# Patient Record
(demographics unavailable — no encounter records)

---

## 2024-10-07 NOTE — HISTORY OF PRESENT ILLNESS
[FreeTextEntry1] : MARIUM NG is a 50 year who returns to follow up for mTBI and post traumatic migraine  last visit was 7/8/2024  since last visit in first 2 months after botox continues to be highly effective and enable much more normal life functioning. this past time it again wore off last couple weeks. had to use excessive rizatriptan in the last 2 weeks.   continues topiramate 50/50, memantine 5/5, gabapentin 300  has still no days without headache. severe migraine headaches, unilat, throbbing, photophobia, dizziness, nausea, in 10 weeks after botox 1-2 weekly, but in last 2 will have them daily. prior to botox headaches were daily with more severe headaches. I have been administering botox for about a year which has helped her functioning dramatically due to a >50% reduction in headache intensity.  preventitive medications include topiramate 50mg BID, memantine 5mg BID, gabapentin 600mg QHS (previously was 900mg QHS). early in her course we tried nortriptyline which caused significant sedation and lethargy at 10mg. higher doses of topiramate caused signfiicant word finding issues. we also tried propranolol up to 120mg which didn't work. we also tried sertraline 25mg which was not well tolerated.  still off balance, still eye movement dysfunctoin

## 2024-10-07 NOTE — PROCEDURE
[FreeTextEntry1] : Chronic migraine botox procedure:  Injection Procedure Record Dilution: 4mL of preservative-free saline per 200U of Botox, Needle: 30 gauge half inch Administration: Inject 0.1 mL (5 units) intramuscularly at each injection site. Side: bilateral EMG guidance: no Tolerated well: Yes Complications: No  Muscle: dosage : 5 U x 2 Procerus: 5 U x 1 Frontalis: 10 U x 2 Temporalis: 20 U x 2 Occipitalis: 15 U x 2 Cervical paraspinals: 10 U x 2 Trapezius: 15 U x 2 Masseter: 0 U  Total units used for injection: 155 U Waste: 45 U  Botox vial information: Lot number: [id number]  Expiration date: 12/2026  Note: Botulinum toxin was charged   I discussed my findings, assessment and plan of action with patient. Potential risks and benefits of injecting Botox discussed in detail and all questions and concerns answered. Potential risks and benefits of other newly prescribed or medications that were continued/renewed reviewed with patient as well. Immediate questions answered. Patient verbalized understanding and agreed with treatment plan. Patient is to follow up with me as above, or sooner as necessary. Patient is to call or e-mail me back with questions or concerns.

## 2024-10-07 NOTE — PHYSICAL EXAM
[FreeTextEntry1] : General: this is a pleasant patient in no acute distress    HEENT conjuntiva are normal    Mental status:  Alert and oriented to person, place and time  Memory is abnormal, came to wrong office today because she forgot which to go to  Language is abnormal, occasional speech hesitancy and word finding difficulties    Head:  Palpation of cranium and jaw: tender temples  Occipital nerve tenderness: +++ L and ++R    Cervical Spine:  Palpation: very tender paraspinals and traps  Lateral Rotation: very limited to L but mild limited to R  Lateral Flexion: very limited b/l  Flexion + rotation: flexion very limited    Eye movements:  extra-occular movements in tact without nystagmus  Saccades: ok  Smooth Pursuit: ok  Visually enhanced VOR: dizzy  Near point of convergence: 30cm    Gait: antalgic, needs cane    Stances:  Romberg: unstable  Shapened romberg: unstable  Single leg, eyes closed: unstable

## 2024-10-31 NOTE — ASSESSMENT
[FreeTextEntry1] : Check Hgb regularly - seeing hematologist. Seeing gynecologist for heavy periods. Needs u/s.   shoulder/cervical - doing acupuncture and PT at [place]. Seeing endocrinologist who put her on ozempic for weight loss. f/u Dr Ortiz (Neuro). Reschedule EMG RUE. work more on periscap muscles shreya serratus anterior get back to water aerobics  lumbar - get back to HEP walk with help reminded to get MRI cervical and lumbar soon shreya since left leg is weaker and in pain   f/u 1 month

## 2024-10-31 NOTE — PHYSICAL EXAM
[FreeTextEntry1] : MARIUM is a 50 year old female  Constitutional: healthy appearing, NAD, and obese  LUMBAR  ROM: flexion to 30 deg, ext to 10 deg with pain  Gait: slightly antalgic and slow with cane  Inspection: no erythema, warmth  Spine: no TTP in spinous process, SI joint, sacrum  Bony palpation: mild TTP in GT on left  Soft tissue palpation hip: no TTP in gluteus aurelio, medius Soft tissue palpation of spine: TTP in right lumbar paraspinals  4/5 right KE, 4- DF, 4+ on left KE, DF 4+ PF bilat sensation intact in bilat LE  Special tests: neg seated SLR   NECK  ROM: flexion to 30, ext to 30, rotation to 30 deg bilat  Inspection: no erythema, warmth Spine: no TTP in spinous process  Soft tissue palpation: TTP in right cervical paraspinals, no TTP in rhomboids, TTP in trapezius  4+/5 elb flex/ext, WE bilat 3 finger abd/ 3 finger flex on right and 4 on left sensation intact in bilat UE

## 2024-10-31 NOTE — PROCEDURE
[de-identified] : Indication: myofascial pain   After informed consent, she elected to proceed with a trigger point injection into the right trapezius, cervical paraspinals and right L5 paraspinals. I confirmed no prior adverse reactions, no active infections, and no relevant allergies.   The skin was prepped in the usual sterile manner. The muscles were pinched and elevated from the chest wall to avoid puncturing the lung. The sites were injected with local anesthetic followed by local needling. The injection was completed without complication and a bandage was applied. She tolerated the procedure well and was given post-injection instructions.   Cold Tx x 48 hours, analgesics prn. Medications: 0.5 ml of 1% Lidocaine per site       Exp 7/2025 Manufacture: [employer]DC 6718-9139-80 LOT [id number]
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
## 2025-01-06 NOTE — HISTORY OF PRESENT ILLNESS
[FreeTextEntry1] : MARIUM NG is a 50 year who returns to follow up for mTBI and post traumatic migraine  last visit was 10/7/2024  since last visit in first 2 months after botox again continues to be highly effective and enable much more normal life functioning. this time the botox does not seem to have worn off like previous times   continues topiramate 50/50, memantine 5/5, gabapentin 300 QHS  has still no days without headache. severe migraine headaches, unilat, throbbing, photophobia, dizziness, nausea, in 10 weeks after botox 1-2 weekly, but in last 2 will have them daily. prior to botox headaches were daily with more severe headaches. I have been administering botox for more than a year which has helped her functioning dramatically due to a >50% reduction in headache intensity.  preventative medications include topiramate 50mg BID, memantine 5mg BID, gabapentin 600mg QHS (previously was 900mg QHS). early in her course we tried nortriptyline which caused significant sedation and lethargy at 10mg. higher doses of topiramate caused significant word finding issues. we also tried propranolol up to 120mg which didn't work. we also tried sertraline 25mg which was not well tolerated.  still off balance, still eye movement dysfunction

## 2025-01-06 NOTE — PHYSICAL EXAM
[FreeTextEntry1] : General: this is a pleasant patient in no acute distress    HEENT conjuntiva are normal    Mental status:  Alert and oriented to person, place and time  Memory is abnormal, came to wrong office today because she forgot which to go to  Language is abnormal, occasional speech hesitancy and word finding difficulties    Head:  Palpation of cranium and jaw: tender temples  Occipital nerve tenderness: +++ L and ++R    Cervical Spine:  Palpation: very tender paraspinals and traps  Lateral Rotation: very limited to L but mild limited to R  Lateral Flexion: very limited b/l  Flexion + rotation: flexion very limited    Eye movements:  extra-occular movements in tact without nystagmus  Saccades: ok  Smooth Pursuit: ok  Visually enhanced VOR: dizzy  Near point of convergence: 30cm    Gait: antalgic, needs cane    Stances:  Romberg: unstable  Shapened romberg: unstable  Single leg, eyes closed: unstable.

## 2025-01-06 NOTE — PROCEDURE
[FreeTextEntry1] : Chronic migraine botox procedure:  Injection Procedure Record Dilution: 4mL of preservative-free saline per 200U of Botox, Needle: 30 gauge half inch Administration: Inject 0.1 mL (5 units) intramuscularly at each injection site. Side: bilateral EMG guidance: no Tolerated well: Yes Complications: No  Muscle: dosage : 5 U x 2 Procerus: 5 U x 1 Frontalis: 10 U x 2 Temporalis: 20 U x 2 Occipitalis: 15 U x 2 Cervical paraspinals: 10 U x 2 Trapezius: 15 U x 2 Masseter: 0 U  Total units used for injection: 155 U Waste: 45 U  Botox vial information: Lot number: [id number] Expiration date: 03/2027  Note: Botulinum toxin was charged  I discussed my findings, assessment and plan of action with patient. Potential risks and benefits of injecting Botox discussed in detail and all questions and concerns answered. Potential risks and benefits of other newly prescribed or medications that were continued/renewed reviewed with patient as well. Immediate questions answered. Patient verbalized understanding and agreed with treatment plan. Patient is to follow up with me as above, or sooner as necessary. Patient is to call or e-mail me back with questions or concerns.

## 2025-04-07 NOTE — PROCEDURE
[FreeTextEntry1] : Chronic migraine botox procedure:  Injection Procedure Record Dilution: 4mL of preservative-free saline per 200U of Botox, Needle: 30 gauge half inch Administration: Inject 0.1 mL (5 units) intramuscularly at each injection site. Side: bilateral EMG guidance: no Tolerated well: Yes Complications: No  Muscle: dosage : 5 U x 2 Procerus: 5 U x 1 Frontalis: 10 U x 2 Temporalis: 20 U x 2 Occipitalis: 15 U x 2 Cervical paraspinals: 10 U x 2 Trapezius: 15 U x 2 Masseter: 0 U  Total units used for injection: 155 U Waste: 45 U  Botox vial information: Lot number: [id number] Expiration date: 03/2027  Note: Botulinum toxin was charged  I discussed my findings, assessment and plan of action with patient. Potential risks and benefits of injecting Botox discussed in detail and all questions and concerns answered. Potential risks and benefits of other newly prescribed or medications that were continued/renewed reviewed with patient as well. Immediate questions answered. Patient verbalized understanding and agreed with treatment plan. Patient is to follow up with me as above, or sooner as necessary. Patient is to call or e-mail me back with questions or concerns.

## 2025-04-07 NOTE — PROCEDURE
[FreeTextEntry1] : Chronic migraine botox procedure:  Injection Procedure Record Dilution: 4mL of preservative-free saline per 200U of Botox, Needle: 30 gauge half inch Administration: Inject 0.1 mL (5 units) intramuscularly at each injection site. Side: bilateral EMG guidance: no Tolerated well: Yes Complications: No  Muscle: dosage : 5 U x 2 Procerus: 5 U x 1 Frontalis: 10 U x 2 Temporalis: 20 U x 2 Occipitalis: 15 U x 2 Cervical paraspinals: 10 U x 2 Trapezius: 15 U x 2 Masseter: 0 U  Total units used for injection: 155 U Waste: 45 U  Botox vial information: Lot number: [id number] Expiration date: 03/2027  Note: Botulinum toxin was charged  I discussed my findings, assessment and plan of action with patient. Potential risks and benefits of injecting Botox discussed in detail and all questions and concerns answered. Potential risks and benefits of other newly prescribed or medications that were continued/renewed reviewed with patient as well. Immediate questions answered. Patient verbalized understanding and agreed with treatment plan. Patient is to follow up with me as above, or sooner as necessary. Patient is to call or e-mail me back with questions or concerns.

## 2025-04-07 NOTE — DATA REVIEWED
[de-identified] : XRay R clavicle 2/27/24: -Unremarkable  MRI lumbar spine 3/10/21: -mild annular bulge at L4-5 -facet joint degenerative changes redemonstrated at L4-5 and L5-S1. No disc herniation, central canal stenoisis, or foraminal stenosis is identified.  MRI cervical spine 3/10/21:  -No interval change compared to previous study -Small central disc protrusion at C3-4 -small central disc protrusion superimposed annular bulge C4-5 -annular bulges at C5-6 and C6-7 -mild to moderate bilateral foraminal stenosis at C4-5 and mild left foraminal stenosis at C6-7.

## 2025-04-07 NOTE — HISTORY OF PRESENT ILLNESS
[FreeTextEntry1] : MARIUM NG is a 51 year who returns to follow up for mTBI and post traumatic migraine.   last visit was 1/6/25. Intractable chronic migraine without aura and without status migrainosus -botox done today and continues to be very effective. 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 Patient turned while in pain, tired to grab onto the counter, and slipped. Her R knee hit the ground and there was bruising on the right leg in the following days.   Headaches: Nerve pain on the right arm and right leg have been triggering migraines. Has not been seeing her Orthopedic doctor consistently for trigger injections to the neck/back/shoulders due to financial strain. Takes Tylenol PRN neck and back pain.   Botox treatment outcomes since last visit:  -Has decreased headache days post treatment on 1/6/25. Initially after treatment, patient has 1 headache day during the first 6-8 weeks post Botox. During the second month, has 15 headache days that last 6-8 hours. During the third month, patient has 26 headache days that last most of the day.  -Reduction from baseline (prior to Botox treatment): Fluctuates per month. -Disability: currently, patient is able to go to work and engage in social activities.  -ER visits since starting Botox: No  -Symptoms: Right sided unilateral pulsating headaches that radiate to the top of the head. Associated headache symptoms are photophobia and phonophobia, nausea, dizziness, and allodynia.   Medications: Topiramate 50mg BID, memantine 5mg BID, rizatriptan 10mg, gabapentin 300mg QHS, and Wegovy through endocrinologist Alcohol- Denies Smoking- Denies Recreational Drug Use- Denies Diet- Vegan Sleep- As headaches worsen, sleep worsens as well. In the last 2 weeks, patient has been waking up frequently throughout the night.  Stress- Increased, patient's mother is in ICU.  Initial history of headaches prior to Botox:  -Onset: 2018. (First visit with me was 4/5/2019)  -Patient had 30 headache days per month with 24 headache hours per day.  -Disability: patient unable to engage in social activities.  -ER visits for headaches: No -Symptoms: Unilateral severe headache with photophobia, phonophobia, nausea, dizziness, and allodynia.      Previous headache treatment that was ineffective or not tolerated: preventitive medications include topiramate 50mg BID, memantine 5mg BID, gabapentin 600mg QHS (previously was 900mg QHS). early in her course we tried nortriptyline which caused significant sedation and lethargy at 10mg. higher doses of topiramate caused signfiicant word finding issues. we also tried propranolol up to 120mg which didn't work. we also tried sertraline 25mg which was not well tolerated.

## 2025-04-07 NOTE — DATA REVIEWED
[de-identified] : XRay R clavicle 2/27/24: -Unremarkable  MRI lumbar spine 3/10/21: -mild annular bulge at L4-5 -facet joint degenerative changes redemonstrated at L4-5 and L5-S1. No disc herniation, central canal stenoisis, or foraminal stenosis is identified.  MRI cervical spine 3/10/21:  -No interval change compared to previous study -Small central disc protrusion at C3-4 -small central disc protrusion superimposed annular bulge C4-5 -annular bulges at C5-6 and C6-7 -mild to moderate bilateral foraminal stenosis at C4-5 and mild left foraminal stenosis at C6-7.

## 2025-07-03 NOTE — HISTORY OF PRESENT ILLNESS
[FreeTextEntry1] : MARIUM NG is a 51 year who returns to follow up for mTBI and post traumatic migraine.  last visit was 4/7/2025  Botox treatment outcomes since last visit: -Has similar headache days post treatment on 4/7/25. Initially after treatment, patient has 1 headache day during the first 6-8 weeks post Botox. During the second month, has 15 headache days that last 6-8 hours. During the third month, patient has 26 headache days that last most of the day. -Reduction from baseline (prior to Botox treatment): Fluctuates per month. -Disability: currently, patient is able to go to work and engage in social activities. now has started attending  -ER visits since starting Botox: No -Symptoms: Right sided unilateral pulsating headaches that radiate to the top of the head. Associated headache symptoms are photophobia and phonophobia, nausea, dizziness, and allodynia.  Medications: Topiramate 50mg BID, memantine 5mg BID, rizatriptan 10mg, gabapentin 300mg QHS, and Wegovy through endocrinologist Alcohol- Denies Smoking- Denies Recreational Drug Use- Denies Diet- Vegan Sleep- As headaches worsen, sleep worsens as well. In the last 2 weeks, patient has been waking up frequently throughout the night. Stress- Increased, patient's mother is in ICU.  Initial history of headaches prior to Botox: -Onset: 2018. (First visit with me was 4/5/2019) -Patient had 30 headache days per month with 24 headache hours per day. -Disability: patient unable to engage in social activities. -ER visits for headaches: No -Symptoms: Unilateral severe headache with photophobia, phonophobia, nausea, dizziness, and allodynia.   Previous headache treatment that was ineffective or not tolerated: preventitive medications include topiramate 50mg BID, memantine 5mg BID, gabapentin 600mg QHS (previously was 900mg QHS). early in her course we tried nortriptyline which caused significant sedation and lethargy at 10mg. higher doses of topiramate caused signfiicant word finding issues. we also tried propranolol up to 120mg which didn't work. we also tried sertraline 25mg which was not well tolerated.

## 2025-07-03 NOTE — PROCEDURE
[FreeTextEntry1] : Chronic migraine botox procedure:  Injection Procedure Record Dilution: 4mL of preservative-free saline per 200U of Botox, Needle: 30 gauge half inch Administration: Inject 0.1 mL (5 units) intramuscularly at each injection site. Side: bilateral EMG guidance: no Tolerated well: Yes Complications: No  Muscle: dosage : 5 U x 2 Procerus: 5 U x 1 Frontalis: 10 U x 2 Temporalis: 20 U x 2 Occipitalis: 15 U x 2 Cervical paraspinals: 10 U x 2 Trapezius: 15 U x 2 Masseter: 0 U  Total units used for injection: 155 U Waste: 45 U  Botox vial information: Lot number: [id number] Expiration date: 10/2027  Note: Botulinum toxin was charged  I discussed my findings, assessment and plan of action with patient. Potential risks and benefits of injecting Botox discussed in detail and all questions and concerns answered. Potential risks and benefits of other newly prescribed or medications that were continued/renewed reviewed with patient as well. Immediate questions answered. Patient verbalized understanding and agreed with treatment plan. Patient is to follow up with me as above, or sooner as necessary. Patient is to call or e-mail me back with questions or concerns.

## 2025-07-03 NOTE — DATA REVIEWED
[de-identified] : XRay R clavicle 2/27/24: -Unremarkable  MRI lumbar spine 3/10/21: -mild annular bulge at L4-5 -facet joint degenerative changes redemonstrated at L4-5 and L5-S1. No disc herniation, central canal stenoisis, or foraminal stenosis is identified.  MRI cervical spine 3/10/21: -No interval change compared to previous study -Small central disc protrusion at C3-4 -small central disc protrusion superimposed annular bulge C4-5 -annular bulges at C5-6 and C6-7 -mild to moderate bilateral foraminal stenosis at C4-5 and mild left foraminal stenosis at C6-7.